# Patient Record
Sex: MALE | Race: ASIAN | NOT HISPANIC OR LATINO
[De-identification: names, ages, dates, MRNs, and addresses within clinical notes are randomized per-mention and may not be internally consistent; named-entity substitution may affect disease eponyms.]

---

## 2017-07-13 ENCOUNTER — APPOINTMENT (OUTPATIENT)
Dept: NEUROLOGY | Facility: CLINIC | Age: 57
End: 2017-07-13

## 2017-07-13 VITALS
SYSTOLIC BLOOD PRESSURE: 135 MMHG | HEART RATE: 67 BPM | HEIGHT: 68 IN | DIASTOLIC BLOOD PRESSURE: 83 MMHG | BODY MASS INDEX: 24.25 KG/M2 | WEIGHT: 160 LBS

## 2017-07-13 RX ORDER — LORAZEPAM 0.5 MG/1
0.5 TABLET ORAL
Refills: 0 | Status: ACTIVE | COMMUNITY

## 2017-08-01 ENCOUNTER — APPOINTMENT (OUTPATIENT)
Dept: NEUROLOGY | Facility: CLINIC | Age: 57
End: 2017-08-01

## 2019-06-04 ENCOUNTER — APPOINTMENT (OUTPATIENT)
Dept: NEUROLOGY | Facility: CLINIC | Age: 59
End: 2019-06-04

## 2020-05-19 ENCOUNTER — APPOINTMENT (OUTPATIENT)
Dept: NEUROLOGY | Facility: CLINIC | Age: 60
End: 2020-05-19
Payer: COMMERCIAL

## 2020-05-19 ENCOUNTER — APPOINTMENT (OUTPATIENT)
Dept: NEUROLOGY | Facility: CLINIC | Age: 60
End: 2020-05-19

## 2020-05-19 DIAGNOSIS — K64.8 OTHER HEMORRHOIDS: ICD-10-CM

## 2020-05-19 DIAGNOSIS — Z82.0 FAMILY HISTORY OF EPILEPSY AND OTHER DISEASES OF THE NERVOUS SYSTEM: ICD-10-CM

## 2020-05-19 PROCEDURE — 99215 OFFICE O/P EST HI 40 MIN: CPT | Mod: 95

## 2020-05-19 NOTE — CONSULT LETTER
[Hakan Barcenas MD., FAAN] : Hakan Barcenas MD., FAAN [Director, Neuromuscular Medicine] : Director, Neuromuscular Medicine [Dear  ___] : Dear ~MIGUEL ANGEL, [Please see my note below.] : Please see my note below. [Consult Letter:] : I had the pleasure of evaluating your patient, [unfilled]. [Sincerely,] : Sincerely, [FreeTextEntry2] : Tammy Carlisle\par [address] \par [place] [id number] [FreeTextEntry3] : Delgado Arango MD, PhD\par Attending Neurologist\par Division of Movement Disorders\par [facility]\par  of Neurology\par [facility]\par \par

## 2020-05-19 NOTE — ASSESSMENT
[FreeTextEntry1] : I had a long conversation with patient and his wife about the diagnosis and he will wait to see a PD specialist before starting sinemet.  All questions answered.

## 2020-05-19 NOTE — DISCUSSION/SUMMARY
[FreeTextEntry1] : In summary, the patient is a 59-year-old right-handed man who was diagnosed with Parkinson's disease in 2017 based on difficulty walking. Notes from examination at the time also suggest right-sided cogwheeling. According to him, a Kaila scan was abnormal, further supporting this diagnosis.\par He now comes referred for further opinion given his difficulty walking. \par The examination (limited by the telehealth setup) was significant for intact extraocular movements, slight right-sided slowing, and walking but could speak but significant right leg dystonia with in turning of the foot. He had no tremor. Overall, the history and examination is possibly consistent with a diagnosis of Parkinson's disease especially the descriptions of the earlier exams, as well as the good response to levodopa. The degree of leg dystonia would be unusual for only 3 years of Parkinson's disease, raising the possibility of an atypical Parkinsonian syndrome such as cortical basal syndrome. However he had no apraxia or other findings that would be consistent with that. He also has mild REM sleep behavior disorder, again more consistent with Parkinson's disease then corticobasal syndrome.\par As a first step, I recommended a repeat MRI of the brain since it has been a while, to see if there any obvious abnormalities. I recommended a trial of taking the sinemet at 2 tabs 4x/day to see if a smaller dose more often improves the dystonia.\par The REM sleep behavior disorder it is mild and rare, but we discussed that it is part of parkinsonian disorders, and could be treated if needed. For now he wants to defer that.\par \par I spent approximately 60 minutes with the patient, examining and discussing the above findings and impression. Followup will be in 3-4 months, sooner if new problems arise.

## 2020-05-19 NOTE — HISTORY OF PRESENT ILLNESS
[Home] : at home, [unfilled] , at the time of the visit. [Medical Office: ([facility])___] : at the medical office located in  [Spouse] : spouse [FreeTextEntry1] : Patient could not connect through amwell, used doximity instead.\par \par The patient is a 59-year-old right-handed man who developed trouble with walking in May of 2017 he was an avid hiker, but his legs are becoming wobbly. They often would get stuck, though he not fall. He had no other symptoms he noted at that time. He initially saw neurologist to consider to hydrocephalus, but according to him and MRI did not support this, and a spinal tap did not improve the walking.\par He was seen by 2 neurologists, including a movement disorder specialist at [facility] (Miguel Foster), and was diagnosed with Parkinson's disease. A Kaila scan apparently also supported the diagnosis. He was started on levodopa, which according to him a "huge difference". However, in 2018 he developed more immobility, stating he froze. Levodopa was increased, and he is now taking 25/100, 3 tablets at 6 AM, 3 tablets at 11 AM, 3 tablets at 5 PM, and one and a half tablets at bedtime. He has no clear fluctuations or dyskinesias, but he does have trouble walking with right foot stiffness. He was evaluated for DBS at [facility], but ultimately deemed to not need it. \par His speech is okay, then he is no dysphagia or drooling. He has no difficulty handwriting, no changes in activities of daily living. His mood is good, and his memory is intact. He is a retired . He does have a history of rarely acting out his dreams, moving and hitting, and also talking. She also has a history of sleepwalking. He does not have constipation, but rather has bulky bowel movements from fiber supplements he takes for hemorrhoids. His father had Parkinson's disease.

## 2020-05-19 NOTE — REVIEW OF SYSTEMS
[Constipation] : constipation [Diarrhea] : diarrhea [As Noted in HPI] : as noted in HPI [Negative] : Heme/Lymph [FreeTextEntry7] : irritable bowel

## 2020-05-19 NOTE — PHYSICAL EXAM
[General Appearance - Alert] : alert [General Appearance - In No Acute Distress] : in no acute distress [Person] : oriented to person [Place] : oriented to place [Short Term Intact] : short term memory intact [Time] : oriented to time [Remote Intact] : remote memory intact [Registration Intact] : recent registration memory intact [Cranial Nerves Optic (II)] : visual acuity intact bilaterally,  visual fields full to confrontation, pupils equal round and reactive to light [Cranial Nerves Oculomotor (III)] : extraocular motion intact [Cranial Nerves Vestibulocochlear (VIII)] : hearing was intact bilaterally [Cranial Nerves Facial (VII)] : face symmetrical [Cranial Nerves Accessory (XI - Cranial And Spinal)] : head turning and shoulder shrug symmetric [Cranial Nerves Hypoglossal (XII)] : there was no tongue deviation with protrusion [Sensation Tactile Decrease] : light touch was intact [No Muscle Atrophy] : normal bulk in all four extremities [Motor Handedness Right-Handed] : the patient is right hand dominant [Balance] : balance was intact [Auscultation Breath Sounds / Voice Sounds] : lungs were clear to auscultation bilaterally [Heart Sounds Pericardial Friction Rub] : no pericardial rub [Naming Objects] : no difficulty naming common objects [Writing A Sentence] : no difficulty writing a sentence [Fluency] : fluency intact [FreeTextEntry5] : mask facies with paucity of blinking [FreeTextEntry1] : Facial expression and volume speech were normal. Extraocular movements were intact abnormal saccade, smooth pursuit, and no square wave jerks seen. Shoulder shrug was slightly decreased in the right. Rapid alternating movements were minimally slowed bilaterally, probably slightly worse on the right. Foot tapping was impaired bilaterally. He had no tremor. He got up from the chair without using his arms. Gait was wide based with significant right leg dystonia with foot inturning. This improved slightly when walking backwards or sideways or going up stairs, but was still present. He had no apraxia. [FreeTextEntry6] : harjinder SCRUGGS

## 2020-05-31 ENCOUNTER — APPOINTMENT (OUTPATIENT)
Dept: MRI IMAGING | Facility: IMAGING CENTER | Age: 60
End: 2020-05-31
Payer: COMMERCIAL

## 2020-05-31 ENCOUNTER — OUTPATIENT (OUTPATIENT)
Dept: OUTPATIENT SERVICES | Facility: HOSPITAL | Age: 60
LOS: 1 days | End: 2020-05-31
Payer: COMMERCIAL

## 2020-05-31 DIAGNOSIS — G20 PARKINSON'S DISEASE: ICD-10-CM

## 2020-05-31 PROCEDURE — 70551 MRI BRAIN STEM W/O DYE: CPT | Mod: 26

## 2020-05-31 PROCEDURE — 70551 MRI BRAIN STEM W/O DYE: CPT

## 2020-06-15 ENCOUNTER — TRANSCRIPTION ENCOUNTER (OUTPATIENT)
Age: 60
End: 2020-06-15

## 2020-08-04 ENCOUNTER — APPOINTMENT (OUTPATIENT)
Dept: NEUROLOGY | Facility: CLINIC | Age: 60
End: 2020-08-04
Payer: COMMERCIAL

## 2020-08-04 DIAGNOSIS — G47.52 REM SLEEP BEHAVIOR DISORDER: ICD-10-CM

## 2020-08-04 PROCEDURE — 99214 OFFICE O/P EST MOD 30 MIN: CPT | Mod: 95

## 2020-08-04 RX ORDER — RASAGILINE 1 MG/1
1 TABLET ORAL
Qty: 90 | Refills: 3 | Status: ACTIVE | COMMUNITY
Start: 2020-08-04 | End: 1900-01-01

## 2020-08-04 NOTE — DISCUSSION/SUMMARY
[FreeTextEntry1] : In summary, the patient is a 60-year-old right-handed man who was diagnosed with Parkinson's disease in 2017 based on difficulty walking. Notes from examination at the time also suggest right-sided cogwheeling. According to him, a Kaila scan was abnormal, further supporting this diagnosis.\par He now comes referred for further opinion given his difficulty walking. \par The examination (limited by the telehealth setup) was significant for intact extraocular movements, slight right-sided slowing, and walking but could speak but significant right leg dystonia with in turning of the foot. He had no tremor. Overall, the history and examination is possibly consistent with a diagnosis of Parkinson's disease especially the descriptions of the earlier exams, as well as the good response to levodopa. The degree of leg dystonia would be unusual for only 3 years of Parkinson's disease, raising the possibility of an atypical Parkinsonian syndrome such as cortical basal syndrome. However he had no apraxia or other findings that would be consistent with that. He also has mild REM sleep behavior disorder, again more consistent with Parkinson's disease then corticobasal syndrome.\par As a first step, I recommended a repeat MRI of the brain since it has been a while, to see if there any obvious abnormalities. I recommended a trial of taking the sinemet at 2 tabs 4x/day to see if a smaller dose more often improves the dystonia.\par The REM sleep behavior disorder it is mild and rare, but we discussed that it is part of parkinsonian disorders, and could be treated if needed. For now he wants to defer that.\par \par \par 1. Overall stable, but with some fluctuations. \par 2. Trial of adding rasagiline for fluctuations \par 3. Exercise \par \par We discussed the above impression, plan and recommendations during the visit. Counseling represented more then 50% of the 30 minute visit time\par

## 2020-08-04 NOTE — PHYSICAL EXAM
[Person] : oriented to person [Place] : oriented to place [Time] : oriented to time [Short Term Intact] : short term memory intact [Remote Intact] : remote memory intact [Registration Intact] : recent registration memory intact [Naming Objects] : no difficulty naming common objects [Writing A Sentence] : no difficulty writing a sentence [Fluency] : fluency intact [Balance] : balance was intact [Sensation Tactile Decrease] : light touch was intact [Motor Handedness Right-Handed] : the patient is right hand dominant [FreeTextEntry5] : mask facies with paucity of blinking [FreeTextEntry1] : Facial expression and volume speech were normal. Extraocular movements were intact abnormal saccade, smooth pursuit, and no square wave jerks seen. Shoulder shrug was slightly decreased in the right. Rapid alternating movements were minimally slowed bilaterally, probably slightly worse on the right. Foot tapping was impaired bilaterally. He had no tremor. He got up from the chair without using his arms. Gait was wide based with significant right leg dystonia with foot inturning. \par \par This improved slightly when walking backwards or sideways or going up stairs, but was still present. He had no apraxia. [FreeTextEntry6] : harjinder SCRUGGS

## 2020-08-04 NOTE — HISTORY OF PRESENT ILLNESS
[Home] : at home, [unfilled] , at the time of the visit. [Medical Office: ([facility])___] : at the medical office located in  [Spouse] : spouse [FreeTextEntry1] : Patient could again not connect through amwell, used doximity instead.\par \par The patient is a 59-year-old right-handed man who developed trouble with walking in May of 2017 he was an avid hiker, but his legs are becoming wobbly. They often would get stuck, though he not fall. He had no other symptoms he noted at that time. He initially saw neurologist to consider to hydrocephalus, but according to him and MRI did not support this, and a spinal tap did not improve the walking.\par He was seen by 2 neurologists, including a movement disorder specialist at [facility] (Miguel Foster), and was diagnosed with Parkinson's disease. A Kaila scan apparently also supported the diagnosis. He was started on levodopa, which according to him a "huge difference". However, in 2018 he developed more immobility, stating he froze.\par \par Since last visit:\par 1. MRI brain 6/1/20 unremarkable\par 2. Tried LD to 2 tabs qid and 1.5 at bedtime. Was a "struggle", then went to 3 tid and 1.5 bedtime, then 2.5 qid (5-10-2-6) plus 1.5 hs. He gets OFF after about 3 hours. These fluctuations are new. \par 3. His mood is good, and his memory is intact. He is a retired . He does have a history of rarely acting out his dreams, moving and hitting, and also talking. Sleep not as good since bedtime sinenmet. She also has a history of sleepwalking. He does not have constipation, but rather has bulky bowel movements from fiber supplements he takes for hemorrhoids. His father had Parkinson's disease.\par \par

## 2020-12-29 ENCOUNTER — APPOINTMENT (OUTPATIENT)
Dept: UROLOGY | Facility: CLINIC | Age: 60
End: 2020-12-29

## 2022-05-12 ENCOUNTER — NON-APPOINTMENT (OUTPATIENT)
Age: 62
End: 2022-05-12

## 2022-05-12 ENCOUNTER — APPOINTMENT (OUTPATIENT)
Dept: OPHTHALMOLOGY | Facility: CLINIC | Age: 62
End: 2022-05-12
Payer: COMMERCIAL

## 2022-05-12 PROCEDURE — 92004 COMPRE OPH EXAM NEW PT 1/>: CPT

## 2023-05-17 ENCOUNTER — NON-APPOINTMENT (OUTPATIENT)
Age: 63
End: 2023-05-17

## 2023-05-17 ENCOUNTER — APPOINTMENT (OUTPATIENT)
Dept: OPHTHALMOLOGY | Facility: CLINIC | Age: 63
End: 2023-05-17
Payer: COMMERCIAL

## 2023-05-17 PROCEDURE — 92133 CPTRZD OPH DX IMG PST SGM ON: CPT

## 2023-05-17 PROCEDURE — 92014 COMPRE OPH EXAM EST PT 1/>: CPT

## 2023-05-17 PROCEDURE — 76514 ECHO EXAM OF EYE THICKNESS: CPT

## 2023-10-18 ENCOUNTER — APPOINTMENT (OUTPATIENT)
Dept: OPHTHALMOLOGY | Facility: CLINIC | Age: 63
End: 2023-10-18
Payer: COMMERCIAL

## 2023-10-18 ENCOUNTER — NON-APPOINTMENT (OUTPATIENT)
Age: 63
End: 2023-10-18

## 2023-10-18 PROCEDURE — 92012 INTRM OPH EXAM EST PATIENT: CPT

## 2024-04-01 ENCOUNTER — APPOINTMENT (OUTPATIENT)
Dept: UROLOGY | Facility: CLINIC | Age: 64
End: 2024-04-01
Payer: COMMERCIAL

## 2024-04-01 VITALS
HEART RATE: 72 BPM | BODY MASS INDEX: 26.07 KG/M2 | OXYGEN SATURATION: 99 % | WEIGHT: 172 LBS | SYSTOLIC BLOOD PRESSURE: 111 MMHG | DIASTOLIC BLOOD PRESSURE: 62 MMHG | RESPIRATION RATE: 16 BRPM | HEIGHT: 68 IN

## 2024-04-01 DIAGNOSIS — N52.9 MALE ERECTILE DYSFUNCTION, UNSPECIFIED: ICD-10-CM

## 2024-04-01 DIAGNOSIS — G20.A1 PARKINSON'S DISEASE WITHOUT DYSKINESIA, WITHOUT MENTION OF FLUCTUATIONS: ICD-10-CM

## 2024-04-01 PROCEDURE — 99205 OFFICE O/P NEW HI 60 MIN: CPT

## 2024-04-01 PROCEDURE — G2211 COMPLEX E/M VISIT ADD ON: CPT

## 2024-04-01 PROCEDURE — 51798 US URINE CAPACITY MEASURE: CPT

## 2024-04-01 RX ORDER — SILDENAFIL 50 MG/1
50 TABLET ORAL
Qty: 10 | Refills: 11 | Status: ACTIVE | COMMUNITY
Start: 2024-04-01 | End: 1900-01-01

## 2024-04-01 RX ORDER — TAMSULOSIN HYDROCHLORIDE 0.4 MG/1
0.4 CAPSULE ORAL
Qty: 180 | Refills: 3 | Status: ACTIVE | COMMUNITY
Start: 2024-04-01 | End: 1900-01-01

## 2024-04-01 RX ORDER — FINASTERIDE 5 MG/1
5 TABLET, FILM COATED ORAL
Qty: 90 | Refills: 3 | Status: ACTIVE | COMMUNITY
Start: 2024-04-01 | End: 1900-01-01

## 2024-04-01 NOTE — ASSESSMENT
[FreeTextEntry1] : PD - we explained to the patient that VUDS may be of benefit with cystoscopy to evaluate current state of BPH and PD, rule out bradykinesia of the external sphincter  Will perform VUDS + CYSTO  consent obtained  Cont Tamsulosin 0.8 mg qhs - renewed to [facility]  scripts  cont finasteride - rx renewed  ED - cont tadalafil add sildenafil to take 30-60 min prior to intercourse on top of tadalafil on EMPTY stomach  will defer oab meds, dry mouth, constipation  afia ldetermine if any 3rd line oab therapies would be beneficial after VUDS CYSTO

## 2024-04-01 NOTE — PHYSICAL EXAM
[General Appearance - Well Developed] : well developed [General Appearance - Well Nourished] : well nourished [Normal Appearance] : normal appearance [Well Groomed] : well groomed [General Appearance - In No Acute Distress] : no acute distress [] : no respiratory distress [Abdomen Tenderness] : non-tender [Skin Color & Pigmentation] : normal skin color and pigmentation [Affect] : the affect was normal [Mood] : the mood was normal [Not Anxious] : not anxious [de-identified] : tremor at rest

## 2024-04-01 NOTE — HISTORY OF PRESENT ILLNESS
[FreeTextEntry1] : 63 year old M w PD 2017 Hx of BPH with urinary frequency and weak stream   OAB with UUI several years  ED - on cialis  Former urologist:   Dr Dinora Cai - started on Tamsulosin 0.8 mg hqs  (did UDS 1.5 years ago)   Dr Bruno Delgado - started prostcar   DTF    1-2 hrs  Nocturia    3-4 UUI  1-2  (mostly at night) Wears depends x 2  BRENDAN denies  Urinary hesitancy  Slight straining to urinate  Coffee   - stopped  (Was 6 cups / day) Tea  denies Alcohol    denies    Soda - denies    Does snore at night Does endorse swollen feet by end of the day    Bowel movements every day, no diarrhea, takes miralax a few times a week  PHMx no cardiac hx no diabetes PD Colonooscopy - having one this week GERD   PSHx none  SOcial hx: never smoker   PVR = 0 cc

## 2024-04-18 ENCOUNTER — APPOINTMENT (OUTPATIENT)
Dept: UROLOGY | Facility: CLINIC | Age: 64
End: 2024-04-18
Payer: COMMERCIAL

## 2024-04-18 ENCOUNTER — OUTPATIENT (OUTPATIENT)
Dept: OUTPATIENT SERVICES | Facility: HOSPITAL | Age: 64
LOS: 1 days | End: 2024-04-18
Payer: COMMERCIAL

## 2024-04-18 VITALS
DIASTOLIC BLOOD PRESSURE: 66 MMHG | SYSTOLIC BLOOD PRESSURE: 112 MMHG | HEART RATE: 72 BPM | OXYGEN SATURATION: 99 % | TEMPERATURE: 97.8 F

## 2024-04-18 DIAGNOSIS — R35.0 FREQUENCY OF MICTURITION: ICD-10-CM

## 2024-04-18 DIAGNOSIS — N40.1 BENIGN PROSTATIC HYPERPLASIA WITH LOWER URINARY TRACT SYMPMS: ICD-10-CM

## 2024-04-18 DIAGNOSIS — N39.41 URGE INCONTINENCE: ICD-10-CM

## 2024-04-18 DIAGNOSIS — N32.81 OVERACTIVE BLADDER: ICD-10-CM

## 2024-04-18 PROCEDURE — 51784 ANAL/URINARY MUSCLE STUDY: CPT | Mod: 26

## 2024-04-18 PROCEDURE — 52000 CYSTOURETHROSCOPY: CPT

## 2024-04-18 PROCEDURE — 51741 ELECTRO-UROFLOWMETRY FIRST: CPT | Mod: 26

## 2024-04-18 PROCEDURE — 51600 INJECTION FOR BLADDER X-RAY: CPT

## 2024-04-18 PROCEDURE — 74455 X-RAY URETHRA/BLADDER: CPT | Mod: 26,59

## 2024-04-18 PROCEDURE — 51797 INTRAABDOMINAL PRESSURE TEST: CPT | Mod: 26

## 2024-04-18 PROCEDURE — 51797 INTRAABDOMINAL PRESSURE TEST: CPT

## 2024-04-18 PROCEDURE — 51728 CYSTOMETROGRAM W/VP: CPT

## 2024-04-18 PROCEDURE — 51728 CYSTOMETROGRAM W/VP: CPT | Mod: 26

## 2024-04-18 PROCEDURE — 51784 ANAL/URINARY MUSCLE STUDY: CPT

## 2024-04-18 PROCEDURE — 74455 X-RAY URETHRA/BLADDER: CPT | Mod: 59

## 2024-04-18 PROCEDURE — 51741 ELECTRO-UROFLOWMETRY FIRST: CPT

## 2024-04-19 DIAGNOSIS — N40.1 BENIGN PROSTATIC HYPERPLASIA WITH LOWER URINARY TRACT SYMPTOMS: ICD-10-CM

## 2024-04-19 DIAGNOSIS — N39.41 URGE INCONTINENCE: ICD-10-CM

## 2024-04-19 DIAGNOSIS — N32.81 OVERACTIVE BLADDER: ICD-10-CM

## 2024-04-23 RX ORDER — VIBEGRON 75 MG/1
75 TABLET, FILM COATED ORAL
Qty: 90 | Refills: 0 | Status: ACTIVE | COMMUNITY
Start: 2024-04-18 | End: 1900-01-01

## 2024-04-29 ENCOUNTER — NON-APPOINTMENT (OUTPATIENT)
Age: 64
End: 2024-04-29

## 2024-06-05 RX ORDER — MIRABEGRON 25 MG/1
25 TABLET, EXTENDED RELEASE ORAL
Qty: 90 | Refills: 3 | Status: ACTIVE | COMMUNITY
Start: 2024-05-09 | End: 1900-01-01

## 2024-06-27 ENCOUNTER — NON-APPOINTMENT (OUTPATIENT)
Age: 64
End: 2024-06-27

## 2024-06-27 ENCOUNTER — APPOINTMENT (OUTPATIENT)
Dept: OPHTHALMOLOGY | Facility: CLINIC | Age: 64
End: 2024-06-27
Payer: MEDICARE

## 2024-06-27 PROCEDURE — 92004 COMPRE OPH EXAM NEW PT 1/>: CPT

## 2024-06-27 PROCEDURE — 92133 CPTRZD OPH DX IMG PST SGM ON: CPT

## 2024-09-12 ENCOUNTER — APPOINTMENT (OUTPATIENT)
Dept: UROLOGY | Facility: CLINIC | Age: 64
End: 2024-09-12
Payer: MEDICARE

## 2024-09-12 VITALS
SYSTOLIC BLOOD PRESSURE: 123 MMHG | RESPIRATION RATE: 16 BRPM | HEART RATE: 71 BPM | TEMPERATURE: 97.6 F | OXYGEN SATURATION: 98 % | DIASTOLIC BLOOD PRESSURE: 77 MMHG

## 2024-09-12 DIAGNOSIS — R35.1 NOCTURIA: ICD-10-CM

## 2024-09-12 DIAGNOSIS — N32.81 OVERACTIVE BLADDER: ICD-10-CM

## 2024-09-12 DIAGNOSIS — N39.41 URGE INCONTINENCE: ICD-10-CM

## 2024-09-12 PROCEDURE — 99205 OFFICE O/P NEW HI 60 MIN: CPT

## 2024-09-12 PROCEDURE — G2211 COMPLEX E/M VISIT ADD ON: CPT

## 2024-09-12 NOTE — HISTORY OF PRESENT ILLNESS
[Currently Experiencing ___] :  [unfilled] [Urinary Incontinence] : urinary incontinence [Urinary Urgency] : urinary urgency [Urinary Frequency] : urinary frequency [Nocturia] : nocturia [Straining] : straining [Weak Stream] : weak stream [FreeTextEntry1] : 64 year old M w PD 2017 Hx of BPH with urinary frequency and weak stream OAB with UUI several years Now refractory to mirabegron  DTF 3-4 hrs Nocturia 4-5 (worsen from 3-4 previously)  UUI 1-2 (mostly at night) Wears depends x 1  BRENDAN denies  Urinary hesitancy Slight straining to urinate Describes his stream as strong at night and weak in the morning.  Doesn't believe he voids properly (frequently have to void twice in a row) Bowel movements: every other day. Uses miralax 1x week, Konsyl D fiber daily.  Medications: myrbetriq, tamsulosin, finasteride

## 2024-09-12 NOTE — END OF VISIT
[FreeTextEntry3] : We discussed the r/b/a of Sacral Neuromodulation. The patient understands the procedure is done with an office PNE (peripheral nerve evaluation) or in 2 procedures or stages, and will only get the implanted battery if there is 50% improvement of the most bothersome symptom. The patient understands the non-rechargeable battery has an estimated life of 10-15 years, and the rechargeable battery has a life of 15 years but needs to be charged 1x/week for 20 minutes.  Risks including infection, bleeding, and rare allergy may lead to device removal. Need for revision surgery may be required.  The patient understands that they won't be able to scuba dive with the device.   The total time spent with the patient includes face to face time as well as time for documentation, ordering medications/labs/procedures, and care coordination, but I acknowledge it does not include time spent on any procedures performed (eg PVR, UDS, Cystoscopy, catheter changes, etc).  Time includes reviewing the chart prior to visit, documentation, and correspondence. [Time Spent: ___ minutes] : I have spent [unfilled] minutes of time on the encounter which excludes teaching and separately reported services.

## 2024-09-12 NOTE — PHYSICAL EXAM
[Normal Appearance] : normal appearance [] : no respiratory distress [Abdomen Soft] : soft [Urinary Bladder Findings] : the bladder was normal on palpation [Normal Station and Gait] : the gait and station were normal for the patient's age [Affect] : the affect was normal

## 2024-09-12 NOTE — ASSESSMENT
[FreeTextEntry1] : Plan - Discontinued the myrbetriq as the patient did not experience any improvement with it.  - We discussed the possible options such as alternative medication, botox injections and Interstim.  - Patient expressed strong preference in the interstim.  - The procedure was described, and patient decided to proceed with it.  - Will schedule

## 2024-10-08 ENCOUNTER — OUTPATIENT (OUTPATIENT)
Dept: OUTPATIENT SERVICES | Facility: HOSPITAL | Age: 64
LOS: 1 days | End: 2024-10-08
Payer: MEDICARE

## 2024-10-08 ENCOUNTER — APPOINTMENT (OUTPATIENT)
Dept: UROLOGY | Facility: CLINIC | Age: 64
End: 2024-10-08

## 2024-10-08 DIAGNOSIS — N39.41 URGE INCONTINENCE: ICD-10-CM

## 2024-10-08 DIAGNOSIS — R35.0 FREQUENCY OF MICTURITION: ICD-10-CM

## 2024-10-08 DIAGNOSIS — R35.1 NOCTURIA: ICD-10-CM

## 2024-10-08 PROCEDURE — 64561 IMPLANT NEUROELECTRODES: CPT | Mod: 50

## 2024-10-08 PROCEDURE — 95972 ALYS CPLX SP/PN NPGT W/PRGRM: CPT | Mod: 59

## 2024-10-09 DIAGNOSIS — N32.81 OVERACTIVE BLADDER: ICD-10-CM

## 2024-10-09 DIAGNOSIS — N39.41 URGE INCONTINENCE: ICD-10-CM

## 2024-10-09 DIAGNOSIS — R35.1 NOCTURIA: ICD-10-CM

## 2024-10-17 ENCOUNTER — NON-APPOINTMENT (OUTPATIENT)
Age: 64
End: 2024-10-17

## 2024-10-17 ENCOUNTER — APPOINTMENT (OUTPATIENT)
Dept: UROLOGY | Facility: CLINIC | Age: 64
End: 2024-10-17
Payer: MEDICARE

## 2024-10-17 VITALS
DIASTOLIC BLOOD PRESSURE: 73 MMHG | BODY MASS INDEX: 26.07 KG/M2 | HEIGHT: 68 IN | WEIGHT: 172 LBS | SYSTOLIC BLOOD PRESSURE: 120 MMHG | OXYGEN SATURATION: 98 % | HEART RATE: 78 BPM

## 2024-10-17 DIAGNOSIS — N32.81 OVERACTIVE BLADDER: ICD-10-CM

## 2024-10-17 PROCEDURE — 99024 POSTOP FOLLOW-UP VISIT: CPT

## 2024-11-18 ENCOUNTER — APPOINTMENT (OUTPATIENT)
Dept: UROLOGY | Facility: CLINIC | Age: 64
End: 2024-11-18

## 2024-11-20 PROCEDURE — C1897: CPT

## 2024-11-20 PROCEDURE — 95972 ALYS CPLX SP/PN NPGT W/PRGRM: CPT

## 2024-11-20 PROCEDURE — 64561 IMPLANT NEUROELECTRODES: CPT

## 2025-07-11 ENCOUNTER — RX RENEWAL (OUTPATIENT)
Age: 65
End: 2025-07-11

## 2025-07-11 RX ORDER — TADALAFIL 20 MG/1
20 TABLET ORAL
Qty: 8 | Refills: 17 | Status: ACTIVE | COMMUNITY
Start: 2025-07-11 | End: 1900-01-01

## 2025-08-19 ENCOUNTER — APPOINTMENT (OUTPATIENT)
Dept: UROLOGY | Facility: CLINIC | Age: 65
End: 2025-08-19
Payer: MEDICARE

## 2025-08-19 VITALS — DIASTOLIC BLOOD PRESSURE: 65 MMHG | SYSTOLIC BLOOD PRESSURE: 121 MMHG

## 2025-08-19 DIAGNOSIS — N31.9 NEUROMUSCULAR DYSFUNCTION OF BLADDER, UNSPECIFIED: ICD-10-CM

## 2025-08-19 PROCEDURE — 51798 US URINE CAPACITY MEASURE: CPT

## 2025-08-19 PROCEDURE — 99215 OFFICE O/P EST HI 40 MIN: CPT

## 2025-08-19 PROCEDURE — G2211 COMPLEX E/M VISIT ADD ON: CPT
